# Patient Record
Sex: MALE | URBAN - METROPOLITAN AREA
[De-identification: names, ages, dates, MRNs, and addresses within clinical notes are randomized per-mention and may not be internally consistent; named-entity substitution may affect disease eponyms.]

---

## 2020-04-30 ENCOUNTER — OFFICE VISIT (OUTPATIENT)
Dept: URBAN - METROPOLITAN AREA CLINIC 76 | Facility: CLINIC | Age: 80
End: 2020-04-30
Payer: COMMERCIAL

## 2020-04-30 PROCEDURE — 92020 GONIOSCOPY: CPT | Performed by: OPHTHALMOLOGY

## 2020-04-30 PROCEDURE — 92002 INTRM OPH EXAM NEW PATIENT: CPT | Performed by: OPHTHALMOLOGY

## 2020-04-30 RX ORDER — LATANOPROST 50 UG/ML
0.005 % SOLUTION OPHTHALMIC
Qty: 7.5 | Refills: 3 | Status: INACTIVE
Start: 2020-04-30 | End: 2020-12-03

## 2020-04-30 ASSESSMENT — INTRAOCULAR PRESSURE
OD: 20
OS: 19

## 2020-04-30 NOTE — IMPRESSION/PLAN
Impression: Primary open-angle glaucoma, bilateral, moderate stage: I73.7783. Bilateral. IOP high today OU. Need to check diurnal variation. s/p SLT OU in [place] per pt. Plan: Discussed condition. Continue Latanoprost QHS OU. Consider additional treatment. Need to obtain previous records.

## 2020-04-30 NOTE — IMPRESSION/PLAN
Impression: Bilateral nonexudative age-related macular degeneration: H35.3130. Bilateral. Plan: Will continue to observe condition and or symptoms. Use of vitamins may reduce progression of ARMD. Needs updated MAC OCT.

## 2020-06-15 ENCOUNTER — OFFICE VISIT (OUTPATIENT)
Dept: URBAN - METROPOLITAN AREA CLINIC 76 | Facility: CLINIC | Age: 80
End: 2020-06-15
Payer: COMMERCIAL

## 2020-06-15 PROCEDURE — 99214 OFFICE O/P EST MOD 30 MIN: CPT | Performed by: OPHTHALMOLOGY

## 2020-06-15 PROCEDURE — 92134 CPTRZ OPH DX IMG PST SGM RTA: CPT | Performed by: OPHTHALMOLOGY

## 2020-06-15 RX ORDER — TIMOLOL MALEATE 5 MG/ML
0.5 % SOLUTION/ DROPS OPHTHALMIC
Qty: 5 | Refills: 3 | Status: INACTIVE
Start: 2020-06-15 | End: 2020-12-02

## 2020-06-15 ASSESSMENT — INTRAOCULAR PRESSURE
OS: 17
OD: 18

## 2020-06-15 NOTE — IMPRESSION/PLAN
Impression: Bilateral nonexudative age-related macular degeneration: H35.3130. Bilateral.
MAC-OCT done today stable Plan: Will continue to observe condition and or symptoms.  Use of vitamins may reduce progression of ARMD.

## 2020-07-20 ENCOUNTER — OFFICE VISIT (OUTPATIENT)
Dept: URBAN - METROPOLITAN AREA CLINIC 71 | Facility: CLINIC | Age: 80
End: 2020-07-20
Payer: COMMERCIAL

## 2020-07-20 DIAGNOSIS — H35.3130 BILATERAL NONEXUDATIVE AGE-RELATED MACULAR DEGENERATION: ICD-10-CM

## 2020-07-20 DIAGNOSIS — H52.4 PRESBYOPIA: ICD-10-CM

## 2020-07-20 PROCEDURE — 92002 INTRM OPH EXAM NEW PATIENT: CPT | Performed by: OPTOMETRIST

## 2020-07-20 ASSESSMENT — VISUAL ACUITY
OD: 20/30
OS: 20/25

## 2020-07-20 ASSESSMENT — INTRAOCULAR PRESSURE
OD: 17
OD: 15
OS: 16
OS: 14

## 2020-07-20 NOTE — IMPRESSION/PLAN
Impression: Primary open-angle glaucoma, bilateral, moderate stage: D61.5526. Bilateral.  s/p SLT OU in [place] per pt. IOP mildly improved OS but Pt only using Timolol QHS OU. Still Need to check diurnal variation. Plan: Discussed condition. Continue Latanoprost QHS OU. Continue using Timolol but BID OU (NOT QD). Will need to get VF.

## 2020-08-17 ENCOUNTER — OFFICE VISIT (OUTPATIENT)
Dept: URBAN - METROPOLITAN AREA CLINIC 71 | Facility: CLINIC | Age: 80
End: 2020-08-17
Payer: COMMERCIAL

## 2020-08-17 PROCEDURE — 99213 OFFICE O/P EST LOW 20 MIN: CPT | Performed by: OPTOMETRIST

## 2020-08-17 ASSESSMENT — KERATOMETRY
OS: 44.13
OD: 43.75

## 2020-08-17 ASSESSMENT — INTRAOCULAR PRESSURE
OS: 13
OS: 10
OD: 9
OD: 15

## 2020-08-17 NOTE — IMPRESSION/PLAN
Impression: Primary open-angle glaucoma, bilateral, moderate stage: U96.4537. Bilateral.  s/p SLT OU in [place] per pt. Target mid teens OU. IOP improved OU. Plan: Discussed condition. Continue Latanoprost QHS OU. Continue using Timolol BID OU (NOT QD). Will need to get VF.

## 2020-10-16 ENCOUNTER — NEW PATIENT (OUTPATIENT)
Dept: URBAN - METROPOLITAN AREA CLINIC 44 | Facility: CLINIC | Age: 80
End: 2020-10-16
Payer: COMMERCIAL

## 2020-10-16 PROCEDURE — 92242 FLUORESCEIN&ICG ANGIOGRAPHY: CPT | Performed by: OPHTHALMOLOGY

## 2020-10-16 PROCEDURE — 92134 CPTRZ OPH DX IMG PST SGM RTA: CPT | Performed by: OPHTHALMOLOGY

## 2020-10-16 PROCEDURE — 92014 COMPRE OPH EXAM EST PT 1/>: CPT | Performed by: OPHTHALMOLOGY

## 2020-10-16 ASSESSMENT — INTRAOCULAR PRESSURE
OD: 16
OS: 13

## 2020-11-20 ENCOUNTER — FOLLOW UP ESTABLISHED (OUTPATIENT)
Dept: URBAN - METROPOLITAN AREA CLINIC 70 | Facility: CLINIC | Age: 80
End: 2020-11-20
Payer: COMMERCIAL

## 2020-11-20 DIAGNOSIS — H35.3124 NONEXUDATIVE AGE-RELATED MACULAR DEGENERATION, LEFT EYE, ADVANCED ATROPHIC WITH SUBFOVEAL INVOLVEMENT: ICD-10-CM

## 2020-11-20 PROCEDURE — 67210 TREATMENT OF RETINAL LESION: CPT | Performed by: OPHTHALMOLOGY

## 2020-11-20 PROCEDURE — 92134 CPTRZ OPH DX IMG PST SGM RTA: CPT | Performed by: OPHTHALMOLOGY

## 2020-11-20 ASSESSMENT — INTRAOCULAR PRESSURE
OD: 12
OS: 12

## 2020-11-30 ENCOUNTER — OFFICE VISIT (OUTPATIENT)
Dept: URBAN - METROPOLITAN AREA CLINIC 71 | Facility: CLINIC | Age: 80
End: 2020-11-30
Payer: COMMERCIAL

## 2020-11-30 DIAGNOSIS — Z96.1 PRESENCE OF INTRAOCULAR LENS: ICD-10-CM

## 2020-11-30 DIAGNOSIS — H10.45 OTHER CHRONIC ALLERGIC CONJUNCTIVITIS: ICD-10-CM

## 2020-11-30 PROCEDURE — 92083 EXTENDED VISUAL FIELD XM: CPT | Performed by: OPTOMETRIST

## 2020-11-30 PROCEDURE — 92012 INTRM OPH EXAM EST PATIENT: CPT | Performed by: OPTOMETRIST

## 2020-11-30 ASSESSMENT — INTRAOCULAR PRESSURE
OD: 12
OS: 13

## 2020-11-30 NOTE — IMPRESSION/PLAN
Impression: Other chronic allergic conjunctivitis: H10.45. OD<OS Plan: Discussed with Pt. Use artificial tears up to QID, recommended Ketotifen or Olopatadine. Call if worsens or no improvement.  continue to observe

## 2020-11-30 NOTE — IMPRESSION/PLAN
Impression: Primary open-angle glaucoma, bilateral, moderate stage: K94.4564. Bilateral.  s/p SLT OU in [place] per pt. Target mid teens OU. IOP improved OU. Baseline VF 11/30/20: nasal defects OU. Plan: Discussed condition. Continue Latanoprost QHS OU, Timolol BID OU (NOT QD). Next VF due 11/2021, OCT due 06/2021.

## 2020-12-04 ENCOUNTER — FOLLOW UP ESTABLISHED (OUTPATIENT)
Dept: URBAN - METROPOLITAN AREA CLINIC 70 | Facility: CLINIC | Age: 80
End: 2020-12-04
Payer: COMMERCIAL

## 2020-12-04 PROCEDURE — 67210 TREATMENT OF RETINAL LESION: CPT | Performed by: OPHTHALMOLOGY

## 2020-12-04 PROCEDURE — 92134 CPTRZ OPH DX IMG PST SGM RTA: CPT | Performed by: OPHTHALMOLOGY

## 2020-12-04 ASSESSMENT — INTRAOCULAR PRESSURE
OS: 12
OD: 14

## 2021-03-01 ENCOUNTER — OFFICE VISIT (OUTPATIENT)
Dept: URBAN - METROPOLITAN AREA CLINIC 71 | Facility: CLINIC | Age: 81
End: 2021-03-01
Payer: COMMERCIAL

## 2021-03-01 PROCEDURE — 99213 OFFICE O/P EST LOW 20 MIN: CPT | Performed by: OPTOMETRIST

## 2021-03-01 ASSESSMENT — INTRAOCULAR PRESSURE
OD: 12
OS: 14

## 2021-03-01 NOTE — IMPRESSION/PLAN
Impression: Primary open-angle glaucoma, bilateral, moderate stage: P98.6605. Bilateral.  s/p SLT OU in [place] per pt. Target mid teens OU. IOP improved OU. Baseline VF 11/30/20: nasal defects OU. Plan: Discussed condition. Continue Latanoprost QHS OU, Timolol BID OU (NOT QD). Next VF due 11/2021, OCT due 06/2021.

## 2021-03-12 ENCOUNTER — FOLLOW UP ESTABLISHED (OUTPATIENT)
Dept: URBAN - METROPOLITAN AREA CLINIC 70 | Facility: CLINIC | Age: 81
End: 2021-03-12
Payer: COMMERCIAL

## 2021-03-12 DIAGNOSIS — H35.3114 NONEXUDATIVE AGE-RELATED MACULAR DEGENERATION, RIGHT EYE, ADVANCED ATROPHIC WITH SUBFOVEAL INVOLVEMENT: Primary | ICD-10-CM

## 2021-03-12 PROCEDURE — 92134 CPTRZ OPH DX IMG PST SGM RTA: CPT | Performed by: OPHTHALMOLOGY

## 2021-03-12 PROCEDURE — 92242 FLUORESCEIN&ICG ANGIOGRAPHY: CPT | Performed by: OPHTHALMOLOGY

## 2021-03-12 PROCEDURE — 67210 TREATMENT OF RETINAL LESION: CPT | Performed by: OPHTHALMOLOGY

## 2021-03-12 ASSESSMENT — INTRAOCULAR PRESSURE
OD: 12
OS: 12

## 2021-03-26 ENCOUNTER — FOLLOW UP ESTABLISHED (OUTPATIENT)
Dept: URBAN - METROPOLITAN AREA CLINIC 70 | Facility: CLINIC | Age: 81
End: 2021-03-26
Payer: COMMERCIAL

## 2021-03-26 PROCEDURE — 67210 TREATMENT OF RETINAL LESION: CPT | Performed by: OPHTHALMOLOGY

## 2021-03-26 PROCEDURE — 92134 CPTRZ OPH DX IMG PST SGM RTA: CPT | Performed by: OPHTHALMOLOGY

## 2021-03-26 ASSESSMENT — INTRAOCULAR PRESSURE
OD: 14
OS: 14

## 2021-06-03 ENCOUNTER — OFFICE VISIT (OUTPATIENT)
Dept: URBAN - METROPOLITAN AREA CLINIC 71 | Facility: CLINIC | Age: 81
End: 2021-06-03
Payer: COMMERCIAL

## 2021-06-03 DIAGNOSIS — H43.812 VITREOUS DEGENERATION, LEFT EYE: ICD-10-CM

## 2021-06-03 PROCEDURE — 92133 CPTRZD OPH DX IMG PST SGM ON: CPT | Performed by: OPTOMETRIST

## 2021-06-03 PROCEDURE — 99214 OFFICE O/P EST MOD 30 MIN: CPT | Performed by: OPTOMETRIST

## 2021-06-03 ASSESSMENT — INTRAOCULAR PRESSURE
OS: 11
OD: 11

## 2021-06-03 NOTE — IMPRESSION/PLAN
Impression: Vitreous degeneration, left eye: H43.812. floaters OD Plan: Continue to monitor with [place] yearly.

## 2021-06-03 NOTE — IMPRESSION/PLAN
Impression: Primary open-angle glaucoma, bilateral, moderate stage: A60.7144. Bilateral.  s/p SLT OU in [place] per pt. Target mid teens OU. IOP improved OU. Baseline VF 11/30/20: nasal defects OU. OCT 06/03/21: WNL/stable OU Plan: Discussed condition. Continue Latanoprost QHS OU, Timolol BID OU. Next VF due 11/2021, [place]/OCT due 06/2022.

## 2021-07-30 ENCOUNTER — OFFICE VISIT (OUTPATIENT)
Dept: URBAN - METROPOLITAN AREA CLINIC 70 | Facility: CLINIC | Age: 81
End: 2021-07-30
Payer: COMMERCIAL

## 2021-07-30 PROCEDURE — 92242 FLUORESCEIN&ICG ANGIOGRAPHY: CPT | Performed by: OPHTHALMOLOGY

## 2021-07-30 PROCEDURE — 92134 CPTRZ OPH DX IMG PST SGM RTA: CPT | Performed by: OPHTHALMOLOGY

## 2021-07-30 PROCEDURE — 99214 OFFICE O/P EST MOD 30 MIN: CPT | Performed by: OPHTHALMOLOGY

## 2021-07-30 ASSESSMENT — INTRAOCULAR PRESSURE
OS: 11
OD: 10

## 2021-07-30 NOTE — IMPRESSION/PLAN
Impression: Dry Macular Degeneration, OD.
s/p ALLA MicroPulse 03/12/21 Plan: Exam, OCT, and ICG/FA reveal dry AMD. Had MicroPulse since October 2018, with Dr. Denis Barrios of [place] [id number].   [facility] laser on hold cont to see every 6 months

## 2021-07-30 NOTE — IMPRESSION/PLAN
Impression: Dry Macular Degeneration, OS.
s/p MicroPulse 03/26/21 Plan: Exam, OCT, and ICG/FA reveal dry AMD. s/p SDM MicroPulse laser [id number]. Intolerant of lenses, use ALLA.

## 2021-10-04 ENCOUNTER — OFFICE VISIT (OUTPATIENT)
Dept: URBAN - METROPOLITAN AREA CLINIC 71 | Facility: CLINIC | Age: 81
End: 2021-10-04
Payer: COMMERCIAL

## 2021-10-04 PROCEDURE — 99213 OFFICE O/P EST LOW 20 MIN: CPT | Performed by: OPTOMETRIST

## 2021-10-04 PROCEDURE — 92083 EXTENDED VISUAL FIELD XM: CPT | Performed by: OPTOMETRIST

## 2021-10-04 ASSESSMENT — INTRAOCULAR PRESSURE
OD: 12
OS: 13

## 2021-10-04 NOTE — IMPRESSION/PLAN
Impression: Primary open-angle glaucoma, bilateral, moderate stage: H45.1488. Bilateral.  s/p SLT OU in [place] per pt. Target mid teens OU. IOP okay OU. Baseline VF 10/04/21: general reduction, overall stable OD, central focal defect, maybe worse OS. OCT 06/03/21: WNL/stable OU Plan: Discussed condition. Continue Latanoprost QHS OU, Timolol BID OU. Next VF due 10/2022, [place]/OCT due 06/2022.

## 2022-01-06 ENCOUNTER — OFFICE VISIT (OUTPATIENT)
Dept: URBAN - METROPOLITAN AREA CLINIC 71 | Facility: CLINIC | Age: 82
End: 2022-01-06
Payer: COMMERCIAL

## 2022-01-06 DIAGNOSIS — H40.1132 PRIMARY OPEN-ANGLE GLAUCOMA, BILATERAL, MODERATE STAGE: Primary | ICD-10-CM

## 2022-01-06 PROCEDURE — 99213 OFFICE O/P EST LOW 20 MIN: CPT | Performed by: OPTOMETRIST

## 2022-01-06 ASSESSMENT — KERATOMETRY
OS: 44.00
OD: 43.88

## 2022-01-06 ASSESSMENT — INTRAOCULAR PRESSURE
OD: 14
OS: 13

## 2022-01-06 NOTE — IMPRESSION/PLAN
Impression: Primary open-angle glaucoma, bilateral, moderate stage: U55.3258. Bilateral.  s/p SLT OU in [place] per pt. Target mid teens OU. IOP okay OU. Baseline VF 10/04/21: general reduction, overall stable OD, central focal defect, maybe worse OS. OCT 06/03/21: WNL/stable OU Plan: Discussed condition. Continue Latanoprost QHS OU, Timolol BID OU. Next VF due 10/2022, [place]/OCT due 06/2022.

## 2022-03-11 ENCOUNTER — OFFICE VISIT (OUTPATIENT)
Dept: URBAN - METROPOLITAN AREA CLINIC 70 | Facility: CLINIC | Age: 82
End: 2022-03-11
Payer: COMMERCIAL

## 2022-03-11 PROCEDURE — 92134 CPTRZ OPH DX IMG PST SGM RTA: CPT | Performed by: OPHTHALMOLOGY

## 2022-03-11 PROCEDURE — 92242 FLUORESCEIN&ICG ANGIOGRAPHY: CPT | Performed by: OPHTHALMOLOGY

## 2022-03-11 PROCEDURE — 99214 OFFICE O/P EST MOD 30 MIN: CPT | Performed by: OPHTHALMOLOGY

## 2022-03-11 ASSESSMENT — INTRAOCULAR PRESSURE
OS: 11
OD: 11

## 2022-03-11 NOTE — IMPRESSION/PLAN
Impression: Dry Macular Degeneration, OS.
s/p MicroPulse 03/26/21 Plan: Exam, OCT, and ICG/FA reveal dry AMD.

## 2022-03-11 NOTE — IMPRESSION/PLAN
Impression: Dry Macular Degeneration, OD.
s/p ALLA MicroPulse 03/12/21 Plan: Exam, OCT, and ICG/FA reveal dry AMD. Had MicroPulse since October 2018, with Dr. Maame Oakley of [place] [address].

## 2022-03-28 NOTE — IMPRESSION/PLAN
Impression: Primary open-angle glaucoma, bilateral, moderate stage: P35.1908. Bilateral.  s/p SLT OU in [place] per pt. IOP OU borderline today. need to check diurnal variation. Plan: Discussed condition. Continue Latanoprost QHS OU. Discussed option of starting additional meds. Will start Timolol BID OU. Will need to get VF. Name band;

## 2022-04-05 ENCOUNTER — OFFICE VISIT (OUTPATIENT)
Dept: URBAN - METROPOLITAN AREA CLINIC 71 | Facility: CLINIC | Age: 82
End: 2022-04-05
Payer: COMMERCIAL

## 2022-04-05 DIAGNOSIS — H35.3132 NONEXUDATIVE AGE-RELATED MACULAR DEGENERATION, BILATERAL, INTERMEDIATE DRY STAGE: ICD-10-CM

## 2022-04-05 PROCEDURE — 76514 ECHO EXAM OF EYE THICKNESS: CPT | Performed by: OPTOMETRIST

## 2022-04-05 PROCEDURE — 92134 CPTRZ OPH DX IMG PST SGM RTA: CPT | Performed by: OPTOMETRIST

## 2022-04-05 PROCEDURE — 99213 OFFICE O/P EST LOW 20 MIN: CPT | Performed by: OPTOMETRIST

## 2022-04-05 ASSESSMENT — INTRAOCULAR PRESSURE
OS: 8
OD: 9

## 2022-04-05 ASSESSMENT — VISUAL ACUITY
OD: 20/40
OS: 20/30

## 2022-04-05 ASSESSMENT — KERATOMETRY
OS: 44.50
OD: 44.00

## 2022-04-05 NOTE — IMPRESSION/PLAN
Impression: Primary open-angle glaucoma, bilateral, moderate stage: V55.4520. Plan: OCT ordered and reviewed today with PT. RNFL remaining stable OU compared to scan from 2021 IOP stable on current treatment plan. Continue the Latanoprost QPM OU and Timolol BID OU. Continue to follow with Dr. Carson Gibson in 3-4 months.

## 2022-04-05 NOTE — IMPRESSION/PLAN
Impression: Presbyopia: H52.4. Plan: Presbyopia is the inability to focus on objects (ie: accommodate) due to the loss of flexibility of your natural lens. Presbyopia occurs with age. Reading glasses, bifocals, trifocals or contacts can be helpful. Contact the office if difficulty focusing persists despite corrective eye wear. New glasses RX given today for progressives. Discussed due to the AMD there is little improvement being made in updating the glasses today. PT voices understanding and wants the updated RX.

## 2022-04-05 NOTE — IMPRESSION/PLAN
Impression: Nonexudative age-related macular degeneration, bilateral, intermediate dry stage: H35.3132. Plan: Stable on exam and OCT Today. Currently follow by Dr. Anusha Le.  Continue to follow with him as scheduled

## 2022-08-08 ENCOUNTER — OFFICE VISIT (OUTPATIENT)
Dept: URBAN - METROPOLITAN AREA CLINIC 71 | Facility: CLINIC | Age: 82
End: 2022-08-08
Payer: COMMERCIAL

## 2022-08-08 DIAGNOSIS — H43.813 VITREOUS DEGENERATION, BILATERAL: ICD-10-CM

## 2022-08-08 DIAGNOSIS — Z96.1 PRESENCE OF INTRAOCULAR LENS: ICD-10-CM

## 2022-08-08 DIAGNOSIS — H40.1132 PRIMARY OPEN-ANGLE GLAUCOMA, BILATERAL, MODERATE STAGE: Primary | ICD-10-CM

## 2022-08-08 DIAGNOSIS — H35.3130 BILATERAL NONEXUDATIVE AGE-RELATED MACULAR DEGENERATION: ICD-10-CM

## 2022-08-08 PROCEDURE — 92133 CPTRZD OPH DX IMG PST SGM ON: CPT | Performed by: OPTOMETRIST

## 2022-08-08 PROCEDURE — 92014 COMPRE OPH EXAM EST PT 1/>: CPT | Performed by: OPTOMETRIST

## 2022-08-08 ASSESSMENT — INTRAOCULAR PRESSURE
OD: 11
OS: 10

## 2022-08-08 NOTE — IMPRESSION/PLAN
Impression: Vitreous degeneration, bilateral: H43.813.  PVD stable OU Plan: Continue to monitor with yearly DE.

## 2022-08-08 NOTE — IMPRESSION/PLAN
Impression: Primary open-angle glaucoma, bilateral, moderate stage: A86.2473. Bilateral.  s/p SLT OU in [place] per pt. Target mid teens OU. IOP okay OU today. Baseline VF 10/04/21: general reduction, overall stable OD, central focal defect, maybe worse OS. OCT 08/08/22: WNL OU, but sup worse OD. Plan: Discussed with pt. Continue Latanoprost QHS OU, Timolol BID OU. Next VF due 10/2022, [place]/OCT due 08/2023.

## 2022-08-08 NOTE — IMPRESSION/PLAN
Impression: Bilateral nonexudative age-related macular degeneration: H35.3130. Bilateral. Pt is being monitored by Dr. Kimberli Harden. Pt has history of receiving micropulse laser treatment and wants to stay in [place] if possible.  Plan: Continue care with Dr. Kimberli Harden as scheduled

## 2023-08-21 ENCOUNTER — OFFICE VISIT (OUTPATIENT)
Dept: URBAN - METROPOLITAN AREA CLINIC 51 | Facility: CLINIC | Age: 83
End: 2023-08-21
Payer: COMMERCIAL

## 2023-08-21 DIAGNOSIS — H35.3134 NONEXUDATIVE MACULAR DEGENERATION, ADVANCED ATROPHIC WITH SUBFOVEAL INVOLVEMENT, BILATERAL: Primary | ICD-10-CM

## 2023-08-21 PROCEDURE — 92242 FLUORESCEIN&ICG ANGIOGRAPHY: CPT | Performed by: OPHTHALMOLOGY

## 2023-08-21 PROCEDURE — 92134 CPTRZ OPH DX IMG PST SGM RTA: CPT | Performed by: OPHTHALMOLOGY

## 2023-08-21 PROCEDURE — 99214 OFFICE O/P EST MOD 30 MIN: CPT | Performed by: OPHTHALMOLOGY

## 2023-08-21 ASSESSMENT — INTRAOCULAR PRESSURE
OD: 10
OS: 9

## 2023-10-27 ENCOUNTER — OFFICE VISIT (OUTPATIENT)
Dept: URBAN - METROPOLITAN AREA CLINIC 71 | Facility: CLINIC | Age: 83
End: 2023-10-27
Payer: COMMERCIAL

## 2023-10-27 DIAGNOSIS — H35.3133 NONEXUDATIVE MACULAR DEGENERATION, ADVANCED ATROPHIC WITHOUT SUBFOVEAL INVOLVEMENT, BILATERAL: Primary | ICD-10-CM

## 2023-10-27 PROCEDURE — 99204 OFFICE O/P NEW MOD 45 MIN: CPT | Performed by: OPHTHALMOLOGY

## 2023-10-27 ASSESSMENT — INTRAOCULAR PRESSURE
OD: 9
OS: 9

## 2024-01-04 ENCOUNTER — OFFICE VISIT (OUTPATIENT)
Dept: URBAN - METROPOLITAN AREA CLINIC 71 | Facility: CLINIC | Age: 84
End: 2024-01-04
Payer: COMMERCIAL

## 2024-01-04 DIAGNOSIS — H35.3133 NONEXUDATIVE MACULAR DEGENERATION, ADVANCED ATROPHIC WITHOUT SUBFOVEAL INVOLVEMENT, BILATERAL: Primary | ICD-10-CM

## 2024-01-04 ASSESSMENT — INTRAOCULAR PRESSURE
OS: 10
OD: 16

## 2024-03-01 ENCOUNTER — OFFICE VISIT (OUTPATIENT)
Dept: URBAN - METROPOLITAN AREA CLINIC 71 | Facility: CLINIC | Age: 84
End: 2024-03-01
Payer: COMMERCIAL

## 2024-03-01 DIAGNOSIS — H35.3133 NONEXUDATIVE MACULAR DEGENERATION, ADVANCED ATROPHIC WITHOUT SUBFOVEAL INVOLVEMENT, BILATERAL: Primary | ICD-10-CM

## 2024-03-01 ASSESSMENT — INTRAOCULAR PRESSURE
OS: 10
OD: 11

## 2024-05-17 ENCOUNTER — OFFICE VISIT (OUTPATIENT)
Dept: URBAN - METROPOLITAN AREA CLINIC 71 | Facility: CLINIC | Age: 84
End: 2024-05-17
Payer: MEDICARE

## 2024-05-17 DIAGNOSIS — H35.3133 NONEXUDATIVE MACULAR DEGENERATION, ADVANCED ATROPHIC WITHOUT SUBFOVEAL INVOLVEMENT, BILATERAL: Primary | ICD-10-CM

## 2024-05-17 ASSESSMENT — INTRAOCULAR PRESSURE
OD: 7
OS: 8

## 2024-07-12 ENCOUNTER — OFFICE VISIT (OUTPATIENT)
Dept: URBAN - METROPOLITAN AREA CLINIC 71 | Facility: CLINIC | Age: 84
End: 2024-07-12
Payer: MEDICARE

## 2024-07-12 DIAGNOSIS — H35.3133 NONEXUDATIVE AGE-RELATED MACULAR DEGENERATION, BILATERAL, ADVANCED ATROPHIC WITHOUT SUBFOVEAL INVOLVEMENT: Primary | ICD-10-CM

## 2024-07-12 PROCEDURE — 92134 CPTRZ OPH DX IMG PST SGM RTA: CPT | Performed by: OPHTHALMOLOGY

## 2024-07-12 ASSESSMENT — INTRAOCULAR PRESSURE
OD: 8
OS: 9

## 2024-08-22 ENCOUNTER — OFFICE VISIT (OUTPATIENT)
Dept: URBAN - METROPOLITAN AREA CLINIC 71 | Facility: CLINIC | Age: 84
End: 2024-08-22
Payer: MEDICARE

## 2024-08-22 DIAGNOSIS — H16.223 KERATOCONJUNCTIVITIS SICCA, BILATERAL: ICD-10-CM

## 2024-08-22 DIAGNOSIS — H35.3133 NONEXUDATIVE AGE-RELATED MACULAR DEGENERATION, BILATERAL, ADVANCED ATROPHIC WITHOUT SUBFOVEAL INVOLVEMENT: Primary | ICD-10-CM

## 2024-08-22 PROCEDURE — 92134 CPTRZ OPH DX IMG PST SGM RTA: CPT | Performed by: OPHTHALMOLOGY

## 2024-08-22 ASSESSMENT — INTRAOCULAR PRESSURE
OD: 10
OS: 10

## 2024-11-14 ENCOUNTER — OFFICE VISIT (OUTPATIENT)
Dept: URBAN - METROPOLITAN AREA CLINIC 71 | Facility: CLINIC | Age: 84
End: 2024-11-14
Payer: MEDICARE

## 2024-11-14 DIAGNOSIS — H35.3133 NONEXUDATIVE AGE-RELATED MACULAR DEGENERATION, BILATERAL, ADVANCED ATROPHIC WITHOUT SUBFOVEAL INVOLVEMENT: Primary | ICD-10-CM

## 2024-11-14 DIAGNOSIS — H16.223 KERATOCONJUNCTIVITIS SICCA, BILATERAL: ICD-10-CM

## 2024-11-14 PROCEDURE — 92134 CPTRZ OPH DX IMG PST SGM RTA: CPT | Performed by: OPHTHALMOLOGY

## 2024-11-14 ASSESSMENT — INTRAOCULAR PRESSURE
OS: 12
OD: 12

## 2025-02-20 ENCOUNTER — OFFICE VISIT (OUTPATIENT)
Dept: URBAN - METROPOLITAN AREA CLINIC 71 | Facility: CLINIC | Age: 85
End: 2025-02-20
Payer: MEDICARE

## 2025-02-20 DIAGNOSIS — H35.3133 NONEXUDATIVE AGE-RELATED MACULAR DEGENERATION, BILATERAL, ADVANCED ATROPHIC WITHOUT SUBFOVEAL INVOLVEMENT: Primary | ICD-10-CM

## 2025-02-20 PROCEDURE — 92134 CPTRZ OPH DX IMG PST SGM RTA: CPT | Performed by: OPHTHALMOLOGY

## 2025-02-20 PROCEDURE — 92014 COMPRE OPH EXAM EST PT 1/>: CPT | Performed by: OPHTHALMOLOGY

## 2025-02-20 ASSESSMENT — INTRAOCULAR PRESSURE
OD: 8
OS: 9

## 2025-04-15 ENCOUNTER — OFFICE VISIT (OUTPATIENT)
Dept: URBAN - METROPOLITAN AREA CLINIC 71 | Facility: CLINIC | Age: 85
End: 2025-04-15
Payer: MEDICARE

## 2025-04-15 DIAGNOSIS — S05.01XD CORNEAL ABRASION W/O FB OF RIGHT EYE, SUBSEQUENT ENCOUNTER: Primary | ICD-10-CM

## 2025-04-15 DIAGNOSIS — S05.02XD CORNEAL ABRASION W/O FB OF LEFT EYE, SUBSEQUENT ENCOUNTER: ICD-10-CM

## 2025-04-15 PROCEDURE — 99212 OFFICE O/P EST SF 10 MIN: CPT

## 2025-04-15 ASSESSMENT — INTRAOCULAR PRESSURE
OD: 9
OS: 9

## 2025-06-05 ENCOUNTER — OFFICE VISIT (OUTPATIENT)
Dept: URBAN - METROPOLITAN AREA CLINIC 71 | Facility: CLINIC | Age: 85
End: 2025-06-05
Payer: MEDICARE

## 2025-06-05 DIAGNOSIS — H35.3133 NONEXUDATIVE AGE-RELATED MACULAR DEGENERATION, BILATERAL, ADVANCED ATROPHIC WITHOUT SUBFOVEAL INVOLVEMENT: Primary | ICD-10-CM

## 2025-06-05 PROCEDURE — 92134 CPTRZ OPH DX IMG PST SGM RTA: CPT | Performed by: OPHTHALMOLOGY

## 2025-06-05 ASSESSMENT — INTRAOCULAR PRESSURE
OS: 8
OD: 7

## 2025-06-10 ENCOUNTER — OFFICE VISIT (OUTPATIENT)
Dept: URBAN - METROPOLITAN AREA CLINIC 71 | Facility: CLINIC | Age: 85
End: 2025-06-10
Payer: MEDICARE

## 2025-06-10 DIAGNOSIS — H16.143 PUNCTATE KERATITIS, BILATERAL: Primary | ICD-10-CM

## 2025-06-10 PROCEDURE — 99213 OFFICE O/P EST LOW 20 MIN: CPT
